# Patient Record
Sex: FEMALE | Race: WHITE | URBAN - NONMETROPOLITAN AREA
[De-identification: names, ages, dates, MRNs, and addresses within clinical notes are randomized per-mention and may not be internally consistent; named-entity substitution may affect disease eponyms.]

---

## 2018-04-11 PROBLEM — H35.363: Noted: 2018-04-11

## 2018-04-11 PROBLEM — Z96.1: Noted: 2017-03-08

## 2018-04-11 PROBLEM — E11.9: Noted: 2017-03-08

## 2018-04-11 PROBLEM — H43.392: Noted: 2018-04-11

## 2018-04-11 PROBLEM — H26.492: Noted: 2017-03-08

## 2019-04-22 ENCOUNTER — IMPORTED ENCOUNTER (OUTPATIENT)
Dept: URBAN - NONMETROPOLITAN AREA CLINIC 1 | Facility: CLINIC | Age: 79
End: 2019-04-22

## 2019-04-22 PROCEDURE — 92014 COMPRE OPH EXAM EST PT 1/>: CPT

## 2019-04-22 NOTE — PATIENT DISCUSSION
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 -Explained the possible effects of poorly controlled diabetes and the damage that diabetes can cause to ocular health. -Patient to check HgbA1C.-Pt instructed to contact our office with any vision changes. Letter sent to Dr. Milagro Bates.  Mid periph drusen OU; Dr's Notes: Letter sent to Milagro Bates 4/19

## 2019-10-28 ENCOUNTER — IMPORTED ENCOUNTER (OUTPATIENT)
Dept: URBAN - NONMETROPOLITAN AREA CLINIC 1 | Facility: CLINIC | Age: 79
End: 2019-10-28

## 2019-10-28 PROCEDURE — 92014 COMPRE OPH EXAM EST PT 1/>: CPT

## 2019-10-28 NOTE — PATIENT DISCUSSION
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 -Explained the possible effects of poorly controlled diabetes and the damage that diabetes can cause to ocular health. -Patient to check HgbA1C.-Pt instructed to contact our office with any vision changes. Letter sent to Dr. Juan Alonso.  Mid periph drusen OU -Discussed findings of exam in detail with the patient.-Discussed the chronic nature of this disease and limited treatment options.-Recommended no smoking.; Dr's Notes: Letter sent to Juan Alonso 4/19

## 2021-03-04 NOTE — PATIENT DISCUSSION
Cold compress until swelling goes down, then switch to warm compresses 2-3xday for 10 min, Add oral zpack, and Maxitrol drops [id number]. F/U 1-2 weeks.

## 2022-04-15 ASSESSMENT — VISUAL ACUITY
OD_SC: 20/20-1
OU_SC: J1+
OS_SC: 20/25+1
OS_SC: 20/20-1
OD_SC: 20/20-1

## 2022-04-15 ASSESSMENT — TONOMETRY
OS_IOP_MMHG: 16
OD_IOP_MMHG: 16
OD_IOP_MMHG: 17
OS_IOP_MMHG: 17

## 2022-08-16 ENCOUNTER — EMERGENCY VISIT (OUTPATIENT)
Dept: RURAL CLINIC 1 | Facility: CLINIC | Age: 82
End: 2022-08-16

## 2022-08-16 DIAGNOSIS — H00.14: ICD-10-CM

## 2022-08-16 PROCEDURE — 99213 OFFICE O/P EST LOW 20 MIN: CPT

## 2022-08-16 ASSESSMENT — VISUAL ACUITY
OD_CC: 20/25
OS_CC: 20/25

## 2022-08-16 ASSESSMENT — TONOMETRY
OD_IOP_MMHG: 17
OS_IOP_MMHG: 17